# Patient Record
Sex: FEMALE | Race: WHITE | NOT HISPANIC OR LATINO | Employment: OTHER | URBAN - METROPOLITAN AREA
[De-identification: names, ages, dates, MRNs, and addresses within clinical notes are randomized per-mention and may not be internally consistent; named-entity substitution may affect disease eponyms.]

---

## 2021-03-26 ENCOUNTER — TELEPHONE (OUTPATIENT)
Dept: GASTROENTEROLOGY | Facility: CLINIC | Age: 41
End: 2021-03-26

## 2021-06-02 ENCOUNTER — HISTORICAL (OUTPATIENT)
Dept: ANESTHESIOLOGY | Facility: HOSPITAL | Age: 41
End: 2021-06-02

## 2021-07-08 ENCOUNTER — HISTORICAL (OUTPATIENT)
Dept: RADIOLOGY | Facility: HOSPITAL | Age: 41
End: 2021-07-08

## 2021-07-08 LAB — POC CREATININE: 1.1 MG/DL (ref 0.6–1.3)

## 2022-04-07 ENCOUNTER — HISTORICAL (OUTPATIENT)
Dept: ADMINISTRATIVE | Facility: HOSPITAL | Age: 42
End: 2022-04-07
Payer: COMMERCIAL

## 2022-04-24 VITALS
SYSTOLIC BLOOD PRESSURE: 102 MMHG | WEIGHT: 119.94 LBS | BODY MASS INDEX: 18.82 KG/M2 | HEIGHT: 67 IN | DIASTOLIC BLOOD PRESSURE: 69 MMHG

## 2022-08-25 DIAGNOSIS — G43.909 MIGRAINE WITHOUT STATUS MIGRAINOSUS, NOT INTRACTABLE, UNSPECIFIED MIGRAINE TYPE: Primary | ICD-10-CM

## 2022-08-25 RX ORDER — ERENUMAB-AOOE 140 MG/ML
140 INJECTION, SOLUTION SUBCUTANEOUS
Refills: 5 | COMMUNITY
Start: 2022-06-27 | End: 2022-08-25 | Stop reason: SDUPTHER

## 2022-08-25 RX ORDER — ERENUMAB-AOOE 140 MG/ML
140 INJECTION, SOLUTION SUBCUTANEOUS
Qty: 1 ML | Refills: 5 | Status: SHIPPED | OUTPATIENT
Start: 2022-08-25 | End: 2023-01-20

## 2022-08-25 NOTE — TELEPHONE ENCOUNTER
Medication: Aimovig 140 mg/ mL    Pharmacy: [facility]    Last Appointment: 07/26/2022 (Appt was cancelled)    Next Appointment: 10/21/2022

## 2022-08-29 ENCOUNTER — TELEPHONE (OUTPATIENT)
Dept: NEUROLOGY | Facility: CLINIC | Age: 42
End: 2022-08-29
Payer: COMMERCIAL

## 2022-08-29 DIAGNOSIS — G43.709 CHRONIC MIGRAINE WITHOUT AURA WITHOUT STATUS MIGRAINOSUS, NOT INTRACTABLE: Primary | ICD-10-CM

## 2022-08-29 RX ORDER — PREDNISONE 10 MG/1
TABLET ORAL
Qty: 21 TABLET | Refills: 0 | Status: SHIPPED | OUTPATIENT
Start: 2022-08-29 | End: 2023-04-21

## 2022-08-29 NOTE — TELEPHONE ENCOUNTER
Patient called reporting since the beginning of August, she has been suffering back with migraines. States today she has one in the back of her neck and moving to the front of her head. Nausea, but denies any vomiting. Experiencing dizziness. Pain level 8/10. Patient admitted to taking some Ibuprofen and Aimovig injection that is once a month, but stated she had no relief. States at her last visit, she was advise to stop Botox injections to see if the migraines would come back or not. Requesting a call back to further discuss what can she next or should she start back the Botox injections. Please advise.

## 2022-08-29 NOTE — TELEPHONE ENCOUNTER
Yes, I would recommend getting back on botox.  I am not sure how to coordinate that so I will route this to Lyn as well.  Lyn, let me know if you need me to do anything to facilitate this.     In the mean time, is she taking anything for rescue?  What has she tried so far to help with her headaches?

## 2022-08-29 NOTE — TELEPHONE ENCOUNTER
Pt states in the past has tried migraine cocktail, but was not helpful does not have any other rescue meds at this time still experiencing nausea and dizziness denies Rx for phenergan or zofran would prefer pharmacy to be [facility] in [place]

## 2022-09-06 DIAGNOSIS — G43.709 CHRONIC MIGRAINE WITHOUT AURA: Primary | ICD-10-CM

## 2022-10-21 ENCOUNTER — PROCEDURE VISIT (OUTPATIENT)
Dept: NEUROLOGY | Facility: CLINIC | Age: 42
End: 2022-10-21
Payer: COMMERCIAL

## 2022-10-21 VITALS
BODY MASS INDEX: 21.5 KG/M2 | HEART RATE: 88 BPM | WEIGHT: 137 LBS | HEIGHT: 67 IN | SYSTOLIC BLOOD PRESSURE: 104 MMHG | DIASTOLIC BLOOD PRESSURE: 76 MMHG

## 2022-10-21 DIAGNOSIS — G43.709 CHRONIC MIGRAINE WITHOUT AURA WITHOUT STATUS MIGRAINOSUS, NOT INTRACTABLE: Primary | ICD-10-CM

## 2022-10-21 PROCEDURE — 64615 CHEMODENERV MUSC MIGRAINE: CPT | Mod: S$GLB,,, | Performed by: NURSE PRACTITIONER

## 2022-10-21 PROCEDURE — 64615 PR CHEMODENERVATION OF MUSCLE FOR CHRONIC MIGRAINE: ICD-10-PCS | Mod: S$GLB,,, | Performed by: NURSE PRACTITIONER

## 2022-10-21 RX ORDER — PRAVASTATIN SODIUM 20 MG/1
20 TABLET ORAL DAILY
COMMUNITY
Start: 2022-09-29

## 2022-10-21 RX ORDER — PROPRANOLOL HYDROCHLORIDE 20 MG/1
1 TABLET ORAL 2 TIMES DAILY
COMMUNITY
Start: 2022-08-23 | End: 2023-04-21

## 2022-10-21 RX ORDER — BACLOFEN 20 MG
1 TABLET ORAL DAILY
COMMUNITY

## 2022-10-21 RX ORDER — ASPIRIN 81 MG/1
81 TABLET ORAL DAILY
COMMUNITY

## 2022-10-21 RX ORDER — BUPROPION HYDROCHLORIDE 300 MG/1
1 TABLET ORAL DAILY
COMMUNITY

## 2022-10-21 RX ORDER — LEVOTHYROXINE SODIUM 88 UG/1
88 TABLET ORAL EVERY OTHER DAY
COMMUNITY
Start: 2022-08-11 | End: 2023-10-20

## 2022-10-21 RX ORDER — LEVOTHYROXINE SODIUM 100 UG/1
100 TABLET ORAL
COMMUNITY
Start: 2022-10-17

## 2022-10-21 RX ORDER — NORETHINDRONE ACETATE AND ETHINYL ESTRADIOL .02; 1 MG/1; MG/1
1 TABLET ORAL DAILY
COMMUNITY
Start: 2022-04-26

## 2022-10-21 NOTE — PROCEDURES
"Procedures  Chief Complaint   Patient presents with    Migraine     Here today for botox injection. Last botox in April. Finds botox was helping before. Since she stop taking botox, migraines has had 4- 5 migraines starting from back of neck, radiating to temporal area, lasting 3 days        Patient with chronic migraine here for botox. At her last appt in April, Dr. Hoffman recommended trial off botox.  Patient says once Botox wore off 3 months after her injections, migraines came back daily.  She finally called in August to see if botox could be resumed.  She is her today to resume botox.  Still on aimovig    Medication List with Changes/Refills   Current Medications    AIMOVIG AUTOINJECTOR 140 MG/ML AUTOINJECTOR    Inject 1 mL (140 mg total) into the skin every 30 days. Inject 140 MG (ONE PEN) Subcutaneously once a month    ASPIRIN (ECOTRIN) 81 MG EC TABLET    Take 81 mg by mouth Daily.    BUPROPION (WELLBUTRIN XL) 300 MG 24 HR TABLET    Take 1 tablet by mouth Daily.    LEVOTHYROXINE (SYNTHROID) 100 MCG TABLET    Take 100 mcg by mouth every other day.    LEVOTHYROXINE (SYNTHROID) 88 MCG TABLET    Take 88 mcg by mouth every other day.    MAGNESIUM OXIDE 500 MG TAB    Take 1 tablet by mouth once daily.    NORETHINDRONE-ETHINYL ESTRADIOL (MICROGESTIN 1/20) 1-20 MG-MCG PER TABLET    Take 1 tablet by mouth Daily.    ONABOTULINUMTOXINA (BOTOX) 200 UNIT SOLR    Inject 200 Units into the muscle every 3 (three) months.    PRAVASTATIN (PRAVACHOL) 20 MG TABLET    Take 20 mg by mouth Daily.    PREDNISONE (DELTASONE) 10 MG TABLET    Day 1 six tablets, Day 2 five tablets, Day 3 four tablets, Day 4 three tablets, Day 5 two tablets, and day 6 one tablet    PROPRANOLOL (INDERAL) 20 MG TABLET    Take 1 tablet by mouth 2 (two) times daily.        /76 (BP Location: Left arm)   Pulse 88   Ht 5' 7" (1.702 m)   Wt 62.1 kg (137 lb)   BMI 21.46 kg/m²       Procedure:  The patient was given several injections of Botox in the " face, head and neck.  There were no complications or side effects immediately following. The injections totaled 155 units and are itemized below. The injections were performed under antiseptic conditions.    B trapezius--30 units total at 6 sites 5-10 units each site  B cervical paraspinal --20 units total at 4 sites with 5-10 units each site  B occipitalis--30 units total at 6 sites with 5-10 units each site  B temporalis- 40 units total at 8 sites with 5-10 units each site  B frontalis--20 units total at 4 sites 5-10 units each site  B --10 units total at 2 sites 5-10 units each site  Procerus-- 5-10 units total    45 units wasted

## 2023-01-20 ENCOUNTER — PROCEDURE VISIT (OUTPATIENT)
Dept: NEUROLOGY | Facility: CLINIC | Age: 43
End: 2023-01-20
Payer: COMMERCIAL

## 2023-01-20 VITALS
BODY MASS INDEX: 21.35 KG/M2 | DIASTOLIC BLOOD PRESSURE: 75 MMHG | HEIGHT: 67 IN | WEIGHT: 136 LBS | SYSTOLIC BLOOD PRESSURE: 106 MMHG | HEART RATE: 80 BPM

## 2023-01-20 DIAGNOSIS — G43.909 MIGRAINE WITHOUT STATUS MIGRAINOSUS, NOT INTRACTABLE, UNSPECIFIED MIGRAINE TYPE: ICD-10-CM

## 2023-01-20 DIAGNOSIS — G43.709 CHRONIC MIGRAINE WITHOUT AURA WITHOUT STATUS MIGRAINOSUS, NOT INTRACTABLE: Primary | ICD-10-CM

## 2023-01-20 PROCEDURE — 64615 PR CHEMODENERVATION OF MUSCLE FOR CHRONIC MIGRAINE: ICD-10-PCS | Mod: S$GLB,,, | Performed by: NURSE PRACTITIONER

## 2023-01-20 PROCEDURE — 64615 CHEMODENERV MUSC MIGRAINE: CPT | Mod: S$GLB,,, | Performed by: NURSE PRACTITIONER

## 2023-01-20 RX ORDER — ATOGEPANT 60 MG/1
60 TABLET ORAL DAILY
Qty: 30 TABLET | Refills: 5 | Status: SHIPPED | OUTPATIENT
Start: 2023-01-20 | End: 2023-04-21

## 2023-01-20 NOTE — PROCEDURES
"Procedures  Chief Complaint   Patient presents with    Migraine     Here today for botox injection. Botox helps with migraine. Only had on migraine, but is having daily headaches to temporal areas, and shoulder areas. Headaches lasts all day long. Pain is dull and severe at times. Headaches are sensitive to light, sound, and odors. Does have some n/v.        Patient with chronic migraine here for botox.  Migraines are overall still well controlled.  She gets a few migraines a month, but has started to notice daily dull headaches over the last several months.  She is still on Aimovig as well.    Medication List with Changes/Refills   Current Medications    AIMOVIG AUTOINJECTOR 140 MG/ML AUTOINJECTOR    Inject 1 mL (140 mg total) into the skin every 30 days. Inject 140 MG (ONE PEN) Subcutaneously once a month    ASPIRIN (ECOTRIN) 81 MG EC TABLET    Take 81 mg by mouth Daily.    BUPROPION (WELLBUTRIN XL) 300 MG 24 HR TABLET    Take 1 tablet by mouth Daily.    LEVOTHYROXINE (SYNTHROID) 100 MCG TABLET    Take 100 mcg by mouth every other day.    LEVOTHYROXINE (SYNTHROID) 88 MCG TABLET    Take 88 mcg by mouth every other day.    MAGNESIUM OXIDE 500 MG TAB    Take 1 tablet by mouth once daily.    NORETHINDRONE-ETHINYL ESTRADIOL (MICROGESTIN 1/20) 1-20 MG-MCG PER TABLET    Take 1 tablet by mouth Daily.    ONABOTULINUMTOXINA (BOTOX) 200 UNIT SOLR    Inject 200 Units into the muscle every 3 (three) months.    PRAVASTATIN (PRAVACHOL) 20 MG TABLET    Take 20 mg by mouth Daily.    PREDNISONE (DELTASONE) 10 MG TABLET    Day 1 six tablets, Day 2 five tablets, Day 3 four tablets, Day 4 three tablets, Day 5 two tablets, and day 6 one tablet    PROPRANOLOL (INDERAL) 20 MG TABLET    Take 1 tablet by mouth 2 (two) times daily.        /75 (BP Location: Right arm)   Pulse 80   Ht 5' 7" (1.702 m)   Wt 61.7 kg (136 lb)   BMI 21.30 kg/m²     NAD  alert and oriented  cognition and perception intact  no aphasia  EOMI  no facial " asymmetry  no dysarthria  moves all extremities symmetrically  no gross coordination abnormalities  gait normal       Procedure:  The patient was given several injections of Botox in the face, head and neck.  There were no complications or side effects immediately following. The injections totaled 155 units and are itemized below. The injections were performed under antiseptic conditions.    B trapezius--30 units total at 6 sites 5-10 units each site  B cervical paraspinal --20 units total at 4 sites with 5-10 units each site  B occipitalis--30 units total at 6 sites with 5-10 units each site  B temporalis- 40 units total at 8 sites with 5-10 units each site  B frontalis--20 units total at 4 sites 5-10 units each site  B --10 units total at 2 sites 5-10 units each site  Procerus-- 5-10 units total    45 units wasted     -Trial of switch from aimovig to qulipta given daily headaches over the last several months

## 2023-02-16 ENCOUNTER — TELEPHONE (OUTPATIENT)
Dept: NEUROLOGY | Facility: CLINIC | Age: 43
End: 2023-02-16
Payer: COMMERCIAL

## 2023-02-16 DIAGNOSIS — G43.709 CHRONIC MIGRAINE W/O AURA W/O STATUS MIGRAINOSUS, NOT INTRACTABLE: Primary | ICD-10-CM

## 2023-02-16 RX ORDER — ERENUMAB-AOOE 140 MG/ML
140 INJECTION, SOLUTION SUBCUTANEOUS
Qty: 1 ML | Refills: 5 | Status: SHIPPED | OUTPATIENT
Start: 2023-02-16 | End: 2023-08-16

## 2023-02-16 RX ORDER — ERENUMAB-AOOE 140 MG/ML
1 INJECTION, SOLUTION SUBCUTANEOUS
COMMUNITY
Start: 2021-11-22 | End: 2023-02-16 | Stop reason: SDUPTHER

## 2023-02-16 NOTE — TELEPHONE ENCOUNTER
Patient called reporting since she started the Qulipta 60 mg on 01/31/2023, her headaches has gotten worst. States the medication is not helping her at all and would like to switch back to the Aimovig instead. Requesting a call back to further discuss. Please advise.

## 2023-02-16 NOTE — TELEPHONE ENCOUNTER
Wants to go back to taking Aimovig. States Qulipta is making her migraines worse. Last dose of qulipta was this morning.

## 2023-04-21 ENCOUNTER — PROCEDURE VISIT (OUTPATIENT)
Dept: NEUROLOGY | Facility: CLINIC | Age: 43
End: 2023-04-21
Payer: COMMERCIAL

## 2023-04-21 VITALS
HEIGHT: 67 IN | BODY MASS INDEX: 17.89 KG/M2 | SYSTOLIC BLOOD PRESSURE: 135 MMHG | DIASTOLIC BLOOD PRESSURE: 92 MMHG | HEART RATE: 94 BPM | WEIGHT: 114 LBS

## 2023-04-21 DIAGNOSIS — G43.709 CHRONIC MIGRAINE WITHOUT AURA WITHOUT STATUS MIGRAINOSUS, NOT INTRACTABLE: Primary | ICD-10-CM

## 2023-04-21 PROCEDURE — 64615 CHEMODENERV MUSC MIGRAINE: CPT | Mod: S$GLB,,, | Performed by: NURSE PRACTITIONER

## 2023-04-21 PROCEDURE — 64615 PR CHEMODENERVATION OF MUSCLE FOR CHRONIC MIGRAINE: ICD-10-PCS | Mod: S$GLB,,, | Performed by: NURSE PRACTITIONER

## 2023-04-21 NOTE — PROCEDURES
"Procedures  Chief Complaint   Patient presents with    Migraine     Pt here for botox office supply         Patient with chronic migraine here for botox.  Migraines are overall still well controlled.  She did try switching to Qulipta after last visit, but her migraines became much more severe on the medication so she switched back to Aimovig.  Now back on Aimovig and migraines are much better.  She has daily dull headaches about a week prior to next injection cycle, but otherwise has very few migraines    Medication List with Changes/Refills   Current Medications    ASPIRIN (ECOTRIN) 81 MG EC TABLET    Take 81 mg by mouth Daily.    BUPROPION (WELLBUTRIN XL) 300 MG 24 HR TABLET    Take 1 tablet by mouth Daily.    ERENUMAB-AOOE (AIMOVIG AUTOINJECTOR) 140 MG/ML AUTOINJECTOR    Inject 1 mL (140 mg total) into the skin every 28 days.    LEVOTHYROXINE (SYNTHROID) 100 MCG TABLET    Take 100 mcg by mouth every other day. Interchange days with 88 mcg    LEVOTHYROXINE (SYNTHROID) 88 MCG TABLET    Take 88 mcg by mouth every other day. Interchange days with 100 mcg    MAGNESIUM OXIDE 500 MG TAB    Take 1 tablet by mouth once daily.    NORETHINDRONE-ETHINYL ESTRADIOL (MICROGESTIN 1/20) 1-20 MG-MCG PER TABLET    Take 1 tablet by mouth Daily.    ONABOTULINUMTOXINA (BOTOX) 200 UNIT SOLR    Inject 200 Units into the muscle every 3 (three) months.    PRAVASTATIN (PRAVACHOL) 20 MG TABLET    Take 20 mg by mouth Daily.   Discontinued Medications    ATOGEPANT (QULIPTA) 60 MG TAB    Take 60 mg by mouth once daily.    PREDNISONE (DELTASONE) 10 MG TABLET    Day 1 six tablets, Day 2 five tablets, Day 3 four tablets, Day 4 three tablets, Day 5 two tablets, and day 6 one tablet    PROPRANOLOL (INDERAL) 20 MG TABLET    Take 1 tablet by mouth 2 (two) times daily.        BP (!) 135/92   Pulse 94   Ht 5' 7" (1.702 m)   Wt 51.7 kg (114 lb)   BMI 17.85 kg/m²     NAD  alert and oriented  cognition and perception intact  no aphasia  EOMI  no " facial asymmetry  no dysarthria  moves all extremities symmetrically  no gross coordination abnormalities  gait normal       Procedure:  The patient was given several injections of Botox in the face, head and neck.  There were no complications or side effects immediately following. The injections totaled 155 units and are itemized below. The injections were performed under antiseptic conditions.    B trapezius--30 units total at 6 sites 5-10 units each site  B cervical paraspinal --20 units total at 4 sites with 5-10 units each site  B occipitalis--30 units total at 6 sites with 5-10 units each site  B temporalis- 40 units total at 8 sites with 5-10 units each site  B frontalis--20 units total at 4 sites 5-10 units each site  B --10 units total at 2 sites 5-10 units each site  Procerus-- 5-10 units total    45 units wasted

## 2023-07-21 ENCOUNTER — PROCEDURE VISIT (OUTPATIENT)
Dept: NEUROLOGY | Facility: CLINIC | Age: 43
End: 2023-07-21
Payer: COMMERCIAL

## 2023-07-21 VITALS
DIASTOLIC BLOOD PRESSURE: 88 MMHG | BODY MASS INDEX: 23.07 KG/M2 | HEIGHT: 67 IN | SYSTOLIC BLOOD PRESSURE: 134 MMHG | HEART RATE: 80 BPM | WEIGHT: 147 LBS

## 2023-07-21 DIAGNOSIS — G43.709 CHRONIC MIGRAINE WITHOUT AURA WITHOUT STATUS MIGRAINOSUS, NOT INTRACTABLE: Primary | ICD-10-CM

## 2023-07-21 DIAGNOSIS — G43.909 MIGRAINE WITHOUT STATUS MIGRAINOSUS, NOT INTRACTABLE, UNSPECIFIED MIGRAINE TYPE: ICD-10-CM

## 2023-07-21 PROCEDURE — 64615 PR CHEMODENERVATION OF MUSCLE FOR CHRONIC MIGRAINE: ICD-10-PCS | Mod: S$GLB,,, | Performed by: NURSE PRACTITIONER

## 2023-07-21 PROCEDURE — 64615 CHEMODENERV MUSC MIGRAINE: CPT | Mod: S$GLB,,, | Performed by: NURSE PRACTITIONER

## 2023-07-21 RX ORDER — PROPRANOLOL HYDROCHLORIDE 20 MG/1
20 TABLET ORAL 2 TIMES DAILY
COMMUNITY

## 2023-07-21 RX ORDER — RIMEGEPANT SULFATE 75 MG/75MG
75 TABLET, ORALLY DISINTEGRATING ORAL DAILY PRN
Qty: 8 TABLET | Refills: 5 | Status: SHIPPED | OUTPATIENT
Start: 2023-07-21

## 2023-07-21 NOTE — PROCEDURES
"Procedures  Chief Complaint   Patient presents with    Migraine     Here today for botox injection. Migraines are better. Only had one migraine since last office visit.        Patient with chronic migraine here for botox.  Has only had 1 migraine since last visit.  Botox very effective for her.  Also still on aimovig which helps.  PCP recently resumed propranolol as well    Medication List with Changes/Refills   New Medications    RIMEGEPANT (NURTEC) 75 MG ODT    Take 1 tablet (75 mg total) by mouth daily as needed for Migraine. Place ODT tablet on the tongue; alternatively the ODT tablet may be placed under the tongue   Current Medications    ASPIRIN (ECOTRIN) 81 MG EC TABLET    Take 81 mg by mouth Daily.    BUPROPION (WELLBUTRIN XL) 300 MG 24 HR TABLET    Take 1 tablet by mouth Daily.    ERENUMAB-AOOE (AIMOVIG AUTOINJECTOR) 140 MG/ML AUTOINJECTOR    Inject 1 mL (140 mg total) into the skin every 28 days.    LEVOTHYROXINE (SYNTHROID) 100 MCG TABLET    Take 100 mcg by mouth every other day. Interchange days with 88 mcg    LEVOTHYROXINE (SYNTHROID) 88 MCG TABLET    Take 88 mcg by mouth every other day. Interchange days with 100 mcg    MAGNESIUM OXIDE 500 MG TAB    Take 1 tablet by mouth once daily.    NORETHINDRONE-ETHINYL ESTRADIOL (MICROGESTIN 1/20) 1-20 MG-MCG PER TABLET    Take 1 tablet by mouth Daily.    ONABOTULINUMTOXINA (BOTOX) 200 UNIT SOLR    Inject 200 Units into the muscle every 3 (three) months.    PRAVASTATIN (PRAVACHOL) 20 MG TABLET    Take 20 mg by mouth Daily.    PROPRANOLOL (INDERAL) 20 MG TABLET    Take 20 mg by mouth 2 (two) times daily.        /88 (BP Location: Left arm)   Pulse 80   Ht 5' 7" (1.702 m)   Wt 66.7 kg (147 lb)   BMI 23.02 kg/m²     NAD  alert and oriented  cognition and perception intact  no aphasia  EOMI  no facial asymmetry  no dysarthria  moves all extremities symmetrically  no gross coordination abnormalities  gait normal       Procedure:  The patient was given several " injections of Botox in the face, head and neck.  There were no complications or side effects immediately following. The injections totaled 155 units and are itemized below. The injections were performed under antiseptic conditions.    B trapezius--30 units total at 6 sites 5-10 units each site  B cervical paraspinal --20 units total at 4 sites with 5-10 units each site  B occipitalis--30 units total at 6 sites with 5-10 units each site  B temporalis- 40 units total at 8 sites with 5-10 units each site  B frontalis--20 units total at 4 sites 5-10 units each site  B --10 units total at 2 sites 5-10 units each site  Procerus-- 5-10 units total    45 units wasted     -Has tried imitrex and zomig in the past. Try nurtec for rescue

## 2023-10-20 ENCOUNTER — PROCEDURE VISIT (OUTPATIENT)
Dept: NEUROLOGY | Facility: CLINIC | Age: 43
End: 2023-10-20
Payer: COMMERCIAL

## 2023-10-20 VITALS
SYSTOLIC BLOOD PRESSURE: 102 MMHG | HEART RATE: 72 BPM | BODY MASS INDEX: 22.91 KG/M2 | WEIGHT: 146 LBS | DIASTOLIC BLOOD PRESSURE: 70 MMHG | HEIGHT: 67 IN

## 2023-10-20 DIAGNOSIS — G43.709 CHRONIC MIGRAINE WITHOUT AURA WITHOUT STATUS MIGRAINOSUS, NOT INTRACTABLE: Primary | ICD-10-CM

## 2023-10-20 PROCEDURE — 64615 PR CHEMODENERVATION OF MUSCLE FOR CHRONIC MIGRAINE: ICD-10-PCS | Mod: S$GLB,,, | Performed by: NURSE PRACTITIONER

## 2023-10-20 PROCEDURE — 64615 CHEMODENERV MUSC MIGRAINE: CPT | Mod: S$GLB,,, | Performed by: NURSE PRACTITIONER

## 2023-10-20 NOTE — PROCEDURES
"Procedures  Chief Complaint   Patient presents with    Migraine     Here today for botox injection. States has not had any migraines since last office visit.        Patient with chronic migraine here for botox.  No migraines since last round of botox    Medication List with Changes/Refills   Current Medications    AIMOVIG AUTOINJECTOR 140 MG/ML AUTOINJECTOR    Inject 1 mL (140 mg total) into the skin every 28 days.    ASPIRIN (ECOTRIN) 81 MG EC TABLET    Take 81 mg by mouth Daily.    BUPROPION (WELLBUTRIN XL) 300 MG 24 HR TABLET    Take 1 tablet by mouth Daily.    LEVOTHYROXINE (SYNTHROID) 100 MCG TABLET    Take 100 mcg by mouth before breakfast. Interchange days with 88 mcg    MAGNESIUM OXIDE 500 MG TAB    Take 1 tablet by mouth once daily.    NORETHINDRONE-ETHINYL ESTRADIOL (MICROGESTIN 1/20) 1-20 MG-MCG PER TABLET    Take 1 tablet by mouth Daily.    ONABOTULINUMTOXINA (BOTOX) 200 UNIT SOLR    Inject 200 Units into the muscle every 3 (three) months.    PRAVASTATIN (PRAVACHOL) 20 MG TABLET    Take 20 mg by mouth Daily.    PROPRANOLOL (INDERAL) 20 MG TABLET    Take 20 mg by mouth 2 (two) times daily.    RIMEGEPANT (NURTEC) 75 MG ODT    Take 1 tablet (75 mg total) by mouth daily as needed for Migraine. Place ODT tablet on the tongue; alternatively the ODT tablet may be placed under the tongue   Discontinued Medications    LEVOTHYROXINE (SYNTHROID) 88 MCG TABLET    Take 88 mcg by mouth every other day. Interchange days with 100 mcg        /70 (BP Location: Left arm)   Pulse 72   Ht 5' 7" (1.702 m)   Wt 66.2 kg (146 lb)   BMI 22.87 kg/m²     NAD  alert and oriented  cognition and perception intact  no aphasia  EOMI  no facial asymmetry  no dysarthria  moves all extremities symmetrically  no gross coordination abnormalities  gait normal       Procedure:  The patient was given several injections of Botox in the face, head and neck.  There were no complications or side effects immediately following. The " injections totaled 155 units and are itemized below. The injections were performed under antiseptic conditions.    B trapezius--30 units total at 6 sites 5-10 units each site  B cervical paraspinal --20 units total at 4 sites with 5-10 units each site  B occipitalis--30 units total at 6 sites with 5-10 units each site  B temporalis- 40 units total at 8 sites with 5-10 units each site  B frontalis--20 units total at 4 sites 5-10 units each site  B --10 units total at 2 sites 5-10 units each site  Procerus-- 5-10 units total    45 units wasted

## 2024-01-18 ENCOUNTER — PROCEDURE VISIT (OUTPATIENT)
Dept: NEUROLOGY | Facility: CLINIC | Age: 44
End: 2024-01-18
Payer: COMMERCIAL

## 2024-01-18 VITALS
HEART RATE: 78 BPM | WEIGHT: 158 LBS | SYSTOLIC BLOOD PRESSURE: 104 MMHG | DIASTOLIC BLOOD PRESSURE: 68 MMHG | HEIGHT: 67 IN | BODY MASS INDEX: 24.8 KG/M2

## 2024-01-18 DIAGNOSIS — G43.709 CHRONIC MIGRAINE WITHOUT AURA WITHOUT STATUS MIGRAINOSUS, NOT INTRACTABLE: Primary | ICD-10-CM

## 2024-01-18 PROCEDURE — 64615 CHEMODENERV MUSC MIGRAINE: CPT | Mod: S$GLB,,, | Performed by: NURSE PRACTITIONER

## 2024-01-18 NOTE — PROCEDURES
"Procedures  Chief Complaint   Patient presents with    Migraine     Here today for botox. Here today for botox injection. Migraines are once every 2 - 3 weeks to temporal area.        Patient with chronic migraine here for botox.  No migraines since last round of botox    Medication List with Changes/Refills   Current Medications    AIMOVIG AUTOINJECTOR 140 MG/ML AUTOINJECTOR    Inject 1 mL (140 mg total) into the skin every 28 days.    ASPIRIN (ECOTRIN) 81 MG EC TABLET    Take 81 mg by mouth Daily.    BUPROPION (WELLBUTRIN XL) 300 MG 24 HR TABLET    Take 1 tablet by mouth Daily.    LEVOTHYROXINE (SYNTHROID) 100 MCG TABLET    Take 100 mcg by mouth before breakfast. Interchange days with 88 mcg    MAGNESIUM OXIDE 500 MG TAB    Take 1 tablet by mouth once daily.    NORETHINDRONE-ETHINYL ESTRADIOL (MICROGESTIN 1/20) 1-20 MG-MCG PER TABLET    Take 1 tablet by mouth Daily.    ONABOTULINUMTOXINA (BOTOX) 200 UNIT SOLR    Inject 200 Units into the muscle every 3 (three) months.    PRAVASTATIN (PRAVACHOL) 20 MG TABLET    Take 20 mg by mouth Daily.    PROPRANOLOL (INDERAL) 20 MG TABLET    Take 20 mg by mouth 2 (two) times daily.    RIMEGEPANT (NURTEC) 75 MG ODT    Take 1 tablet (75 mg total) by mouth daily as needed for Migraine. Place ODT tablet on the tongue; alternatively the ODT tablet may be placed under the tongue        /68 (BP Location: Left arm)   Pulse 78   Ht 5' 7" (1.702 m)   Wt 71.7 kg (158 lb)   BMI 24.75 kg/m²     NAD  alert and oriented  cognition and perception intact  no aphasia  EOMI  no facial asymmetry  no dysarthria  moves all extremities symmetrically  no gross coordination abnormalities  gait normal       Procedure:  The patient was given several injections of Botox in the face, head and neck.  There were no complications or side effects immediately following. The injections totaled 155 units and are itemized below. The injections were performed under antiseptic conditions.    B trapezius--30 " units total at 6 sites 5-10 units each site  B cervical paraspinal --20 units total at 4 sites with 5-10 units each site  B occipitalis--30 units total at 6 sites with 5-10 units each site  B temporalis- 40 units total at 8 sites with 5-10 units each site  B frontalis--20 units total at 4 sites 5-10 units each site  B --10 units total at 2 sites 5-10 units each site  Procerus-- 5-10 units total    45 units wasted

## 2024-04-18 ENCOUNTER — PROCEDURE VISIT (OUTPATIENT)
Dept: NEUROLOGY | Facility: CLINIC | Age: 44
End: 2024-04-18
Payer: COMMERCIAL

## 2024-04-18 VITALS
DIASTOLIC BLOOD PRESSURE: 84 MMHG | HEART RATE: 76 BPM | BODY MASS INDEX: 24.17 KG/M2 | HEIGHT: 67 IN | WEIGHT: 154 LBS | SYSTOLIC BLOOD PRESSURE: 110 MMHG

## 2024-04-18 DIAGNOSIS — G43.709 CHRONIC MIGRAINE W/O AURA W/O STATUS MIGRAINOSUS, NOT INTRACTABLE: ICD-10-CM

## 2024-04-18 DIAGNOSIS — G43.709 CHRONIC MIGRAINE WITHOUT AURA WITHOUT STATUS MIGRAINOSUS, NOT INTRACTABLE: Primary | ICD-10-CM

## 2024-04-18 PROCEDURE — 64615 CHEMODENERV MUSC MIGRAINE: CPT | Mod: S$GLB,,, | Performed by: NURSE PRACTITIONER

## 2024-04-18 RX ORDER — ONDANSETRON HYDROCHLORIDE 8 MG/1
8 TABLET, FILM COATED ORAL EVERY 6 HOURS PRN
Qty: 12 TABLET | Refills: 0 | Status: SHIPPED | OUTPATIENT
Start: 2024-04-18

## 2024-04-18 RX ORDER — KETOROLAC TROMETHAMINE 10 MG/1
10 TABLET, FILM COATED ORAL EVERY 6 HOURS PRN
Qty: 12 TABLET | Refills: 0 | Status: SHIPPED | OUTPATIENT
Start: 2024-04-18

## 2024-04-18 RX ORDER — ERENUMAB-AOOE 140 MG/ML
140 INJECTION, SOLUTION SUBCUTANEOUS
Qty: 1 ML | Refills: 5 | Status: SHIPPED | OUTPATIENT
Start: 2024-04-18

## 2024-04-18 NOTE — PROCEDURES
"Procedures  Chief Complaint   Patient presents with    Migraine     Here today for botox injection. Since last office visit has had about 2 bad migraines, lasting for a few days. States one of them she had to leave work. Migraines are located to temporal areas and behind eyes. Pain is stabbing to head. Does have n/v with migraine. Migraines are sensitive to light.         Patient with chronic migraine here for botox.    Medication List with Changes/Refills   Current Medications    AIMOVIG AUTOINJECTOR 140 MG/ML AUTOINJECTOR    Inject 1 mL (140 mg total) into the skin every 28 days.    ASPIRIN (ECOTRIN) 81 MG EC TABLET    Take 81 mg by mouth Daily.    BUPROPION (WELLBUTRIN XL) 300 MG 24 HR TABLET    Take 1 tablet by mouth Daily.    LEVOTHYROXINE (SYNTHROID) 100 MCG TABLET    Take 100 mcg by mouth before breakfast. Interchange days with 88 mcg    MAGNESIUM OXIDE 500 MG TAB    Take 1 tablet by mouth once daily.    NORETHINDRONE-ETHINYL ESTRADIOL (MICROGESTIN 1/20) 1-20 MG-MCG PER TABLET    Take 1 tablet by mouth Daily.    ONABOTULINUMTOXINA (BOTOX) 200 UNIT SOLR    Inject 200 Units into the muscle every 3 (three) months.    PRAVASTATIN (PRAVACHOL) 20 MG TABLET    Take 20 mg by mouth Daily.    PROPRANOLOL (INDERAL) 20 MG TABLET    Take 20 mg by mouth 2 (two) times daily.    RIMEGEPANT (NURTEC) 75 MG ODT    Take 1 tablet (75 mg total) by mouth daily as needed for Migraine. Place ODT tablet on the tongue; alternatively the ODT tablet may be placed under the tongue        /84 (BP Location: Left arm)   Pulse 76   Ht 5' 7" (1.702 m)   Wt 69.9 kg (154 lb)   BMI 24.12 kg/m²     NAD  alert and oriented  cognition and perception intact  no aphasia  EOMI  no facial asymmetry  no dysarthria  moves all extremities symmetrically  no gross coordination abnormalities  gait normal       Procedure:  The patient was given several injections of Botox in the face, head and neck.  There were no complications or side effects " immediately following. The injections totaled 155 units and are itemized below. The injections were performed under antiseptic conditions.    B trapezius--30 units total at 6 sites 5-10 units each site  B cervical paraspinal --20 units total at 4 sites with 5-10 units each site  B occipitalis--30 units total at 6 sites with 5-10 units each site  B temporalis- 40 units total at 8 sites with 5-10 units each site  B frontalis--20 units total at 4 sites 5-10 units each site  B --10 units total at 2 sites 5-10 units each site  Procerus-- 5-10 units total    45 units wasted

## 2024-07-18 ENCOUNTER — PROCEDURE VISIT (OUTPATIENT)
Dept: NEUROLOGY | Facility: CLINIC | Age: 44
End: 2024-07-18
Payer: COMMERCIAL

## 2024-07-18 VITALS
DIASTOLIC BLOOD PRESSURE: 73 MMHG | HEART RATE: 81 BPM | HEIGHT: 67 IN | WEIGHT: 156.19 LBS | BODY MASS INDEX: 24.52 KG/M2 | SYSTOLIC BLOOD PRESSURE: 121 MMHG

## 2024-07-18 DIAGNOSIS — G43.709 CHRONIC MIGRAINE WITHOUT AURA WITHOUT STATUS MIGRAINOSUS, NOT INTRACTABLE: Primary | ICD-10-CM

## 2024-07-18 PROCEDURE — 64615 CHEMODENERV MUSC MIGRAINE: CPT | Mod: S$GLB,,, | Performed by: NURSE PRACTITIONER

## 2024-07-18 NOTE — PROCEDURES
"Procedures  Chief Complaint   Patient presents with    Migraine     Pt here for botox office supply        Patient with chronic migraine here for botox.  Migraines overall well controlled.  She did have a 2 week wear off before this round of injections.  She has had a daily headache for 2 weeks.  She tried migraine cocktail and Nurtec, but did not see benefit.    Medication List with Changes/Refills   Current Medications    ASPIRIN (ECOTRIN) 81 MG EC TABLET    Take 81 mg by mouth Daily.    BUPROPION (WELLBUTRIN XL) 300 MG 24 HR TABLET    Take 1 tablet by mouth Daily.    ERENUMAB-AOOE (AIMOVIG AUTOINJECTOR) 140 MG/ML AUTOINJECTOR    Inject 1 mL (140 mg total) into the skin every 28 days.    KETOROLAC (TORADOL) 10 MG TABLET    Take 1 tablet (10 mg total) by mouth every 6 (six) hours as needed for Pain (migraine). Migraine cocktail to take with zofran and benadryl    LEVOTHYROXINE (SYNTHROID) 100 MCG TABLET    Take 100 mcg by mouth before breakfast.    MAGNESIUM OXIDE 500 MG TAB    Take 1 tablet by mouth once daily.    NORETHINDRONE-ETHINYL ESTRADIOL (MICROGESTIN 1/20) 1-20 MG-MCG PER TABLET    Take 1 tablet by mouth Daily.    ONABOTULINUMTOXINA (BOTOX) 200 UNIT SOLR    Inject 200 Units into the muscle every 3 (three) months.    ONDANSETRON (ZOFRAN) 8 MG TABLET    Take 1 tablet (8 mg total) by mouth every 6 (six) hours as needed for Nausea (migraine). Migraine cocktail to be taken with ketorolac and benadryl    PRAVASTATIN (PRAVACHOL) 20 MG TABLET    Take 20 mg by mouth Daily.    PROPRANOLOL (INDERAL) 20 MG TABLET    Take 20 mg by mouth 2 (two) times daily.    RIMEGEPANT (NURTEC) 75 MG ODT    Take 1 tablet (75 mg total) by mouth daily as needed for Migraine. Place ODT tablet on the tongue; alternatively the ODT tablet may be placed under the tongue        /73   Pulse 81   Ht 5' 7" (1.702 m)   Wt 70.9 kg (156 lb 3.2 oz)   BMI 24.46 kg/m²     NAD  alert and oriented  cognition and perception intact  no " aphasia  EOMI  no facial asymmetry  no dysarthria  moves all extremities symmetrically  no gross coordination abnormalities  gait normal       Procedure:  The patient was given several injections of Botox in the face, head and neck.  There were no complications or side effects immediately following. The injections totaled 155 units and are itemized below. The injections were performed under antiseptic conditions.    B trapezius--30 units total at 6 sites 5-10 units each site  B cervical paraspinal --20 units total at 4 sites with 5-10 units each site  B occipitalis--30 units total at 6 sites with 5-10 units each site  B temporalis- 40 units total at 8 sites with 5-10 units each site  B frontalis--20 units total at 4 sites 5-10 units each site  B --10 units total at 2 sites 5-10 units each site  Procerus-- 5-10 units total    45 units wasted     -try bridging to next Botox with Nurtec q.o.d. for 2-3 weeks prior to next round of injections

## 2024-09-16 DIAGNOSIS — G43.709 CHRONIC MIGRAINE W/O AURA W/O STATUS MIGRAINOSUS, NOT INTRACTABLE: ICD-10-CM

## 2024-09-16 RX ORDER — ERENUMAB-AOOE 140 MG/ML
140 INJECTION, SOLUTION SUBCUTANEOUS
Qty: 1 ML | Refills: 5 | Status: SHIPPED | OUTPATIENT
Start: 2024-09-16

## 2024-10-18 ENCOUNTER — PROCEDURE VISIT (OUTPATIENT)
Dept: NEUROLOGY | Facility: CLINIC | Age: 44
End: 2024-10-18
Payer: COMMERCIAL

## 2024-10-18 VITALS
WEIGHT: 157 LBS | HEART RATE: 80 BPM | BODY MASS INDEX: 24.64 KG/M2 | HEIGHT: 67 IN | SYSTOLIC BLOOD PRESSURE: 120 MMHG | DIASTOLIC BLOOD PRESSURE: 84 MMHG

## 2024-10-18 DIAGNOSIS — G43.709 CHRONIC MIGRAINE WITHOUT AURA WITHOUT STATUS MIGRAINOSUS, NOT INTRACTABLE: ICD-10-CM

## 2024-10-18 DIAGNOSIS — G43.709 CHRONIC MIGRAINE W/O AURA W/O STATUS MIGRAINOSUS, NOT INTRACTABLE: Primary | ICD-10-CM

## 2024-10-18 NOTE — PROCEDURES
"Procedures  Chief Complaint   Patient presents with    Migraine     Here to day for botox injection. States botox does help with migraines. Has not had any migraines since last offic e visit.        Patient with chronic migraine here for botox.  Migraine free since last visit    Medication List with Changes/Refills   Current Medications    AIMOVIG AUTOINJECTOR 140 MG/ML AUTOINJECTOR    Inject 1 mL (140 mg total) into the skin every 28 days.    ASPIRIN (ECOTRIN) 81 MG EC TABLET    Take 81 mg by mouth Daily.    BUPROPION (WELLBUTRIN XL) 300 MG 24 HR TABLET    Take 1 tablet by mouth Daily.    KETOROLAC (TORADOL) 10 MG TABLET    Take 1 tablet (10 mg total) by mouth every 6 (six) hours as needed for Pain (migraine). Migraine cocktail to take with zofran and benadryl    LEVOTHYROXINE (SYNTHROID) 100 MCG TABLET    Take 100 mcg by mouth before breakfast.    MAGNESIUM OXIDE 500 MG TAB    Take 1 tablet by mouth once daily.    ONABOTULINUMTOXINA (BOTOX) 200 UNIT SOLR    Inject 200 Units into the muscle every 3 (three) months.    ONDANSETRON (ZOFRAN) 8 MG TABLET    Take 1 tablet (8 mg total) by mouth every 6 (six) hours as needed for Nausea (migraine). Migraine cocktail to be taken with ketorolac and benadryl    PRAVASTATIN (PRAVACHOL) 20 MG TABLET    Take 20 mg by mouth Daily.    PROPRANOLOL (INDERAL) 20 MG TABLET    Take 20 mg by mouth 2 (two) times daily.    RIMEGEPANT (NURTEC) 75 MG ODT    Take 1 tablet (75 mg total) by mouth daily as needed for Migraine. Place ODT tablet on the tongue; alternatively the ODT tablet may be placed under the tongue   Discontinued Medications    NORETHINDRONE-ETHINYL ESTRADIOL (MICROGESTIN 1/20) 1-20 MG-MCG PER TABLET    Take 1 tablet by mouth Daily.        /84 (BP Location: Left arm, Patient Position: Sitting)   Pulse 80   Ht 5' 7" (1.702 m)   Wt 71.2 kg (157 lb)   BMI 24.59 kg/m²     NAD  alert and oriented  cognition and perception intact  no aphasia  EOMI  no facial asymmetry  no " dysarthria  moves all extremities symmetrically  no gross coordination abnormalities  gait normal       Procedure:  The patient was given several injections of Botox in the face, head and neck.  There were no complications or side effects immediately following. The injections totaled 155 units and are itemized below. The injections were performed under antiseptic conditions.    B trapezius--30 units total at 6 sites 5-10 units each site  B cervical paraspinal --20 units total at 4 sites with 5-10 units each site  B occipitalis--30 units total at 6 sites with 5-10 units each site  B temporalis- 40 units total at 8 sites with 5-10 units each site  B frontalis--20 units total at 4 sites 5-10 units each site  B --10 units total at 2 sites 5-10 units each site  Procerus-- 5-10 units total    45 units wasted

## 2025-01-17 ENCOUNTER — PROCEDURE VISIT (OUTPATIENT)
Dept: NEUROLOGY | Facility: CLINIC | Age: 45
End: 2025-01-17
Payer: COMMERCIAL

## 2025-01-17 VITALS
HEIGHT: 67 IN | WEIGHT: 162 LBS | DIASTOLIC BLOOD PRESSURE: 84 MMHG | HEART RATE: 77 BPM | BODY MASS INDEX: 25.43 KG/M2 | SYSTOLIC BLOOD PRESSURE: 123 MMHG

## 2025-01-17 DIAGNOSIS — G43.709 CHRONIC MIGRAINE WITHOUT AURA WITHOUT STATUS MIGRAINOSUS, NOT INTRACTABLE: Primary | ICD-10-CM

## 2025-01-17 PROCEDURE — 64615 CHEMODENERV MUSC MIGRAINE: CPT | Mod: S$GLB,,, | Performed by: NURSE PRACTITIONER

## 2025-01-17 NOTE — PROCEDURES
"Procedures  Chief Complaint   Patient presents with    Migraine     Pt here for Botox injections for migraines. Botox injections have been helping.        Patient with chronic migraine here for botox.   Migraine free since last visit    Medication List with Changes/Refills   Current Medications    AIMOVIG AUTOINJECTOR 140 MG/ML AUTOINJECTOR    Inject 1 mL (140 mg total) into the skin every 28 days.    ASPIRIN (ECOTRIN) 81 MG EC TABLET    Take 81 mg by mouth Daily.    BUPROPION (WELLBUTRIN XL) 300 MG 24 HR TABLET    Take 1 tablet by mouth Daily.    KETOROLAC (TORADOL) 10 MG TABLET    Take 1 tablet (10 mg total) by mouth every 6 (six) hours as needed for Pain (migraine). Migraine cocktail to take with zofran and benadryl    LEVOTHYROXINE (SYNTHROID) 100 MCG TABLET    Take 100 mcg by mouth before breakfast.    MAGNESIUM OXIDE 500 MG TAB    Take 1 tablet by mouth once daily.    ONABOTULINUMTOXINA (BOTOX) 200 UNIT SOLR    Inject 200 Units into the muscle every 3 (three) months.    ONDANSETRON (ZOFRAN) 8 MG TABLET    Take 1 tablet (8 mg total) by mouth every 6 (six) hours as needed for Nausea (migraine). Migraine cocktail to be taken with ketorolac and benadryl    PRAVASTATIN (PRAVACHOL) 20 MG TABLET    Take 20 mg by mouth Daily.    PROPRANOLOL (INDERAL) 20 MG TABLET    Take 20 mg by mouth 2 (two) times daily.    RIMEGEPANT (NURTEC) 75 MG ODT    Take 1 tablet (75 mg total) by mouth daily as needed for Migraine. Place ODT tablet on the tongue; alternatively the ODT tablet may be placed under the tongue        /84 (BP Location: Left arm, Patient Position: Sitting)   Pulse 77   Ht 5' 7" (1.702 m)   Wt 73.5 kg (162 lb)   BMI 25.37 kg/m²     NAD  alert and oriented  cognition and perception intact  no aphasia  EOMI  no facial asymmetry  no dysarthria  moves all extremities symmetrically  no gross coordination abnormalities  gait normal       Procedure:  The patient was given several injections of Botox in the face, " head and neck.  There were no complications or side effects immediately following. The injections totaled 155 units and are itemized below. The injections were performed under antiseptic conditions.    B trapezius--30 units total at 6 sites 5-10 units each site  B cervical paraspinal --20 units total at 4 sites with 5-10 units each site  B occipitalis--30 units total at 6 sites with 5-10 units each site  B temporalis- 40 units total at 8 sites with 5-10 units each site  B frontalis--20 units total at 4 sites 5-10 units each site  B --10 units total at 2 sites 5-10 units each site  Procerus-- 5-10 units total    45 units wasted

## 2025-03-21 DIAGNOSIS — G43.709 CHRONIC MIGRAINE W/O AURA W/O STATUS MIGRAINOSUS, NOT INTRACTABLE: ICD-10-CM

## 2025-03-24 RX ORDER — ERENUMAB-AOOE 140 MG/ML
INJECTION, SOLUTION SUBCUTANEOUS
Qty: 1 ML | Refills: 5 | Status: SHIPPED | OUTPATIENT
Start: 2025-03-24

## 2025-04-21 ENCOUNTER — PROCEDURE VISIT (OUTPATIENT)
Dept: NEUROLOGY | Facility: CLINIC | Age: 45
End: 2025-04-21
Payer: COMMERCIAL

## 2025-04-21 VITALS
WEIGHT: 163 LBS | SYSTOLIC BLOOD PRESSURE: 132 MMHG | BODY MASS INDEX: 25.58 KG/M2 | DIASTOLIC BLOOD PRESSURE: 81 MMHG | HEART RATE: 79 BPM | HEIGHT: 67 IN

## 2025-04-21 DIAGNOSIS — G43.709 CHRONIC MIGRAINE W/O AURA W/O STATUS MIGRAINOSUS, NOT INTRACTABLE: Primary | ICD-10-CM

## 2025-04-21 PROCEDURE — 64615 CHEMODENERV MUSC MIGRAINE: CPT | Mod: S$GLB,,,

## 2025-04-21 NOTE — PROCEDURES
Neurology Clinic    Procedure Note:  BOTOX Injections For Chronic Migraine    SUBJECTIVE:  Patient ID: Cinda Hirsch  Chief Complaint: F/U Migraine-Botox (Pt here for f/u for Botox injections.)    History of Present Illness:  Cinda Hirsch is a 44 y.o. female who presents to clinic for follow-up of migraines and Botox injections.     The patient has chronic migraines (G43.719) and prior to BOTOX, would suffer from headaches more than 15 days a month and 8 or more migraines per month. Migraines would last more than 4 hours a day with no relief of symptoms despite trying multiple medications.     Currently, migraines are well controlled with Q3mo BOTOX injections and Aimovig monthly.  Has an average of 1 migraine per month    Review of Systems - A review of 10+ systems was conducted with pertinent positive and negative findings documented in HPI with all other systems reviewed and negative.    PFSH: Past medical, family, and social history reviewed as documented in chart     Current Medications:  Current Outpatient Medications   Medication Instructions    AIMOVIG AUTOINJECTOR 140 mg/mL autoinjector INJECT 1 ML (140 MG TOTAL) SUBCUTANEOUSLY EVERY 28 DAYS    aspirin (ECOTRIN) 81 mg, Daily    buPROPion (WELLBUTRIN XL) 300 MG 24 hr tablet 1 tablet, Daily    ketorolac (TORADOL) 10 mg, Oral, Every 6 hours PRN, Migraine cocktail to take with zofran and benadryl    levothyroxine (SYNTHROID) 100 mcg, Before breakfast    magnesium oxide 500 mg Tab 1 tablet, Daily    NURTEC 75 mg, Oral, Daily PRN, Place ODT tablet on the tongue; alternatively the ODT tablet may be placed under the tongue    onabotulinumtoxina (BOTOX) 200 Units, Every 3 months    ondansetron (ZOFRAN) 8 mg, Oral, Every 6 hours PRN, Migraine cocktail to be taken with ketorolac and benadryl    pravastatin (PRAVACHOL) 20 mg, Daily    propranoloL (INDERAL) 20 mg, 2 times daily       OBJECTIVE  Vitals:  /81 (BP Location: Left arm, Patient Position: Sitting)    "Pulse 79   Ht 5' 7" (1.702 m)   Wt 73.9 kg (163 lb)   BMI 25.53 kg/m²     Physical Exam:  Constitutional: she appears well-developed and well-nourished. she is well groomed. NAD   No facial asymmetry    Imaging:  No results found for this or any previous visit.      PROCEDURE NOTE:  BOTOX was performed as an indicated therapy for intractable chronic migraine headaches given that the patient failed more than 2 headache medications    A time out was conducted just before the start of the procedure to verify the correct patient and procedure, procedure location, and all relevant critical information.     Botulinum Toxin Injection Procedure     PROCEDURE PERFORMED: Botulinum toxin injection (78504)  CLINICAL INDICATION: G43.719  After risks and benefits were explained including bleeding, infection, worsening of pain, damage to the areas being injected, weakness of muscles, loss of muscle control, dysphagia if injecting the head or neck, facial droop if injecting the facial area, painful injection, allergic or other reaction to the medications being injected, and the failure of the procedure to help the problem, a signed consent was obtained.   The patient was placed in a comfortable area and the sites to be treated were identified.The area to be treated was prepped three times with alcohol and the alcohol allowed to dry. Next, a 30 gauge needle was used to inject the medication in the area to be treated.      Total Botox used: 155 Units   Unavoidable waste: 45 Units     Injection sites:    muscle bilaterally ( a total of 10 units divided into 2 sites)   Procerus muscle (5 units)   Frontalis muscle bilaterally (a total of 20 units divided into 4 sites)   Temporalis muscle bilaterally (a total of 40 units divided into 8 sites)   Occipitalis muscle bilaterally (a total of 30 units divided into 6 sites)   Cervical paraspinal muscles (a total of 20 units divided into 4 sites)   Trapezius muscle bilaterally (a " total of 30 units divided into 6 sites)   Complications: none       ASSESSMENT/ PLAN:       Chronic migraine w/o aura w/o status migrainosus, not intractable    -  Primary     - Botox administered in clinic for Chronic Migraine      - RTC in 3 mo for repeat Botox injections or sooner if needed     Orders Placed This Encounter    Prior authorization Order    onabotulinumtoxina injection 200 Units       Candis Mahoney, MSN, APRN, FNP-C  [facility]  [phone]

## 2025-07-22 ENCOUNTER — PROCEDURE VISIT (OUTPATIENT)
Dept: NEUROLOGY | Facility: CLINIC | Age: 45
End: 2025-07-22
Payer: COMMERCIAL

## 2025-07-22 VITALS
DIASTOLIC BLOOD PRESSURE: 89 MMHG | HEART RATE: 70 BPM | BODY MASS INDEX: 24.8 KG/M2 | HEIGHT: 67 IN | WEIGHT: 158 LBS | SYSTOLIC BLOOD PRESSURE: 129 MMHG

## 2025-07-22 DIAGNOSIS — G43.709 CHRONIC MIGRAINE WITHOUT AURA WITHOUT STATUS MIGRAINOSUS, NOT INTRACTABLE: Primary | ICD-10-CM

## 2025-07-22 PROCEDURE — 64615 CHEMODENERV MUSC MIGRAINE: CPT | Mod: S$GLB,,, | Performed by: NURSE PRACTITIONER

## 2025-07-22 NOTE — PROCEDURES
"Procedures  Chief Complaint   Patient presents with    Migraine     Pt here for Botox injections for migraines. Botox has been beneficial.        Patient with chronic migraine here for botox.  Well Controlled.  Only has about 1 a month    Medication List with Changes/Refills   Current Medications    AIMOVIG AUTOINJECTOR 140 MG/ML AUTOINJECTOR    INJECT 1 ML (140 MG TOTAL) SUBCUTANEOUSLY EVERY 28 DAYS    ASPIRIN (ECOTRIN) 81 MG EC TABLET    Take 81 mg by mouth Daily.    BUPROPION (WELLBUTRIN XL) 300 MG 24 HR TABLET    Take 1 tablet by mouth Daily.    KETOROLAC (TORADOL) 10 MG TABLET    Take 1 tablet (10 mg total) by mouth every 6 (six) hours as needed for Pain (migraine). Migraine cocktail to take with zofran and benadryl    LEVOTHYROXINE (SYNTHROID) 100 MCG TABLET    Take 100 mcg by mouth before breakfast.    MAGNESIUM OXIDE 500 MG TAB    Take 1 tablet by mouth once daily.    ONABOTULINUMTOXINA (BOTOX) 200 UNIT SOLR    Inject 200 Units into the muscle every 3 (three) months.    ONDANSETRON (ZOFRAN) 8 MG TABLET    Take 1 tablet (8 mg total) by mouth every 6 (six) hours as needed for Nausea (migraine). Migraine cocktail to be taken with ketorolac and benadryl    PRAVASTATIN (PRAVACHOL) 20 MG TABLET    Take 20 mg by mouth Daily.    PROPRANOLOL (INDERAL) 20 MG TABLET    Take 20 mg by mouth 2 (two) times daily.    RIMEGEPANT (NURTEC) 75 MG ODT    Take 1 tablet (75 mg total) by mouth daily as needed for Migraine. Place ODT tablet on the tongue; alternatively the ODT tablet may be placed under the tongue        /89 (BP Location: Right arm, Patient Position: Sitting)   Pulse 70   Ht 5' 7" (1.702 m)   Wt 71.7 kg (158 lb)   BMI 24.75 kg/m²     NAD  alert and oriented  cognition and perception intact  no aphasia  EOMI  no facial asymmetry  no dysarthria  moves all extremities symmetrically  no gross coordination abnormalities  gait normal       Procedure:  The patient was given several injections of Botox in the " face, head and neck.  There were no complications or side effects immediately following. The injections totaled 155 units and are itemized below. The injections were performed under antiseptic conditions.    B trapezius--30 units total at 6 sites 5-10 units each site  B cervical paraspinal --20 units total at 4 sites with 5-10 units each site  B occipitalis--30 units total at 6 sites with 5-10 units each site  B temporalis- 40 units total at 8 sites with 5-10 units each site  B frontalis--20 units total at 4 sites 5-10 units each site  B --10 units total at 2 sites 5-10 units each site  Procerus-- 5-10 units total    45 units wasted